# Patient Record
Sex: MALE | Race: WHITE | HISPANIC OR LATINO | Employment: FULL TIME | URBAN - METROPOLITAN AREA
[De-identification: names, ages, dates, MRNs, and addresses within clinical notes are randomized per-mention and may not be internally consistent; named-entity substitution may affect disease eponyms.]

---

## 2019-03-14 ENCOUNTER — OFFICE VISIT (OUTPATIENT)
Dept: FAMILY MEDICINE | Facility: CLINIC | Age: 39
End: 2019-03-14
Payer: COMMERCIAL

## 2019-03-14 VITALS
RESPIRATION RATE: 16 BRPM | OXYGEN SATURATION: 97 % | WEIGHT: 195.19 LBS | BODY MASS INDEX: 26.44 KG/M2 | DIASTOLIC BLOOD PRESSURE: 82 MMHG | HEART RATE: 96 BPM | HEIGHT: 72 IN | TEMPERATURE: 98 F | SYSTOLIC BLOOD PRESSURE: 128 MMHG

## 2019-03-14 DIAGNOSIS — B35.1 NAIL FUNGUS: ICD-10-CM

## 2019-03-14 DIAGNOSIS — M79.18 PAIN IN ABDOMINAL MUSCLE OF RIGHT FLANK: Primary | ICD-10-CM

## 2019-03-14 PROCEDURE — 99202 OFFICE O/P NEW SF 15 MIN: CPT | Mod: S$GLB,,, | Performed by: NURSE PRACTITIONER

## 2019-03-14 PROCEDURE — 99202 PR OFFICE/OUTPT VISIT, NEW, LEVL II, 15-29 MIN: ICD-10-PCS | Mod: S$GLB,,, | Performed by: NURSE PRACTITIONER

## 2019-03-14 RX ORDER — BACLOFEN 10 MG/1
10 TABLET ORAL 2 TIMES DAILY PRN
Qty: 60 TABLET | Refills: 0 | Status: SHIPPED | OUTPATIENT
Start: 2019-03-14 | End: 2019-04-11 | Stop reason: SDUPTHER

## 2019-03-14 RX ORDER — NAPROXEN 500 MG/1
500 TABLET ORAL 2 TIMES DAILY
Qty: 28 TABLET | Refills: 0 | Status: SHIPPED | OUTPATIENT
Start: 2019-03-14 | End: 2019-03-28

## 2019-03-14 RX ORDER — TERBINAFINE HYDROCHLORIDE 250 MG/1
250 TABLET ORAL DAILY
Qty: 90 TABLET | Refills: 0 | Status: SHIPPED | OUTPATIENT
Start: 2019-03-14

## 2019-03-14 RX ORDER — BACITRACIN ZINC 500 UNIT/G
1 OINTMENT (GRAM) TOPICAL 2 TIMES DAILY
COMMUNITY

## 2019-03-14 NOTE — PATIENT INSTRUCTIONS
Take medication as directed and return in 2 weeks for follow up. If no improvement, abdominal US will be ordered.   Muscle Strain in the Abdomen  A muscle strain is a stretching or tearing of the muscle fibers. It is also called a pulled muscle. The abdomen is protected by a thick wall of muscle in the front and sides. These muscles help with twisting and bending forward. Too much coughing, lifting heavy objects, or sudden jerking movements can sometimes cause a muscle strain in the abdomen. This causes pain that is worse when you move. The area may also feel tender or look swollen and bruised.  Home care  · Apply an ice pack over the injured area for 15 to 20 minutes every 3 to 6 hours. You should do this for the first 24 to 48 hours. You can make an ice pack by filling a plastic bag that seals at the top with ice cubes and then wrapping it with a thin towel. Be careful not to injure your skin with the ice treatments. Ice should never be applied directly to skin. Continue the use of ice packs for relief of pain and swelling as needed. After 48 hours, apply heat (warm shower or warm bath) for 15 to 20 minutes several times a day, or alternate ice and heat.  · You may use over-the-counter pain medicine to control pain, unless another pain medicine was prescribed. If you have liver or kidney disease, a stomach ulcer or GI bleeding, talk with your healthcare provider before using these medicines.  Follow-up care  Follow up with your healthcare provider, or as advised.  Call 911  Call 911 if you have:  · Weakness, lightheaded, or faint  · Chest pain  When to seek medical advice  Call your healthcare provider right away if any of these occur:  · Pain gets worse or moves to the right lower abdomen, just below the waistline  · Fever of 100.4°F (38°C) or above lasting for 24 to 48 hours  · Vomiting  · Severe abdominal pain that spreads to the back or toward the groin  · Blood in the urine  · Unexpected vaginal bleeding in  women  Date Last Reviewed: 11/19/2015  © 6363-5351 [employer]. [address] [place] [address], [place] [address]. All rights reserved. This information is not intended as a substitute for professional medical care. Always follow your healthcare professional's instructions.

## 2019-03-14 NOTE — PROGRESS NOTES
Subjective:       Patient ID: Gustavo Best is a 39 y.o. male.    Chief Complaint: est pcp, abd/groin pain    HPI   Pt c/o pain in his lower R quadrant for about 3 months, mild pain, thinks might have a hernia or pulled something when he runs or makes any movement that involves the R hip. C/o the pain worsening in the last 2 days.  Never had any abd surgery in past.   Pt c/o his great R toe having something wrong with his nail, thinks possibly a fungus      Review of Systems   Constitutional: Negative for activity change, appetite change, chills, fatigue and fever.   Respiratory: Negative for apnea, cough, chest tightness, shortness of breath and wheezing.    Cardiovascular: Negative for chest pain, palpitations and leg swelling.   Gastrointestinal: Negative for abdominal distention, abdominal pain, blood in stool, constipation, diarrhea and vomiting.   Genitourinary: Negative for difficulty urinating, dysuria, flank pain, frequency and urgency.   Musculoskeletal: Negative for arthralgias, back pain, gait problem, joint swelling and neck pain.   Skin: Negative for color change, pallor and rash.   Neurological: Negative for dizziness, tremors, syncope, weakness, light-headedness and numbness.   Hematological: Negative for adenopathy. Does not bruise/bleed easily.   Psychiatric/Behavioral: Negative for agitation, confusion and sleep disturbance. The patient is not nervous/anxious.        Objective:      Physical Exam   Constitutional: He is oriented to person, place, and time. Vital signs are normal. He appears well-developed and well-nourished.   HENT:   Head: Normocephalic and atraumatic.   Mouth/Throat: Oropharynx is clear and moist.   Eyes: Conjunctivae are normal. Pupils are equal, round, and reactive to light.   Neck: Trachea normal and normal range of motion. Neck supple. Carotid bruit is not present.   Cardiovascular: Normal rate, regular rhythm and normal heart sounds.   Pulmonary/Chest: Effort normal and  breath sounds normal.   Abdominal: Soft. Bowel sounds are normal.   Musculoskeletal: Normal range of motion.   Neurological: He is alert and oriented to person, place, and time.   Skin: Skin is warm, dry and intact.   Psychiatric: He has a normal mood and affect. His speech is normal and behavior is normal. Judgment normal.       Assessment:       1. Pain in abdominal muscle of right flank    2. Nail fungus        Plan:       Weakened abd muscles/strain suspected, but no palpable hernia. Will start on NSAID and muscle relaxer x 2 weeks and have him RTC. If no improvement than abd US will be ordered.

## 2019-03-28 ENCOUNTER — OFFICE VISIT (OUTPATIENT)
Dept: FAMILY MEDICINE | Facility: CLINIC | Age: 39
End: 2019-03-28
Payer: COMMERCIAL

## 2019-03-28 VITALS
SYSTOLIC BLOOD PRESSURE: 106 MMHG | BODY MASS INDEX: 27.02 KG/M2 | TEMPERATURE: 98 F | HEART RATE: 67 BPM | DIASTOLIC BLOOD PRESSURE: 68 MMHG | WEIGHT: 193 LBS | HEIGHT: 71 IN | OXYGEN SATURATION: 96 %

## 2019-03-28 DIAGNOSIS — B35.1 ONYCHOMYCOSIS: Primary | ICD-10-CM

## 2019-03-28 DIAGNOSIS — K40.90 UNILATERAL INGUINAL HERNIA WITHOUT OBSTRUCTION OR GANGRENE, RECURRENCE NOT SPECIFIED: ICD-10-CM

## 2019-03-28 PROBLEM — K43.9 HERNIA OF ABDOMINAL WALL: Status: ACTIVE | Noted: 2019-03-28

## 2019-03-28 PROCEDURE — 99213 OFFICE O/P EST LOW 20 MIN: CPT | Mod: S$GLB,,, | Performed by: FAMILY MEDICINE

## 2019-03-28 PROCEDURE — 99213 PR OFFICE/OUTPT VISIT, EST, LEVL III, 20-29 MIN: ICD-10-PCS | Mod: S$GLB,,, | Performed by: FAMILY MEDICINE

## 2019-03-28 NOTE — PROGRESS NOTES
Subjective:       Patient ID: Gustavo Best is a 39 y.o. male.    Chief Complaint: Follow-up (Pt is here because he is still having pain in the right flank of his abdominal muscle when he runs due to a longer strides. Pt stated that the meds he was given for pain did not help. Ms Bertha Esposito, NP prescribed Terbinafine, Baclofen, and Naproxen.)    38 yo M here for f/u on possible hernia and onychomycosis.   Pt was seen by Bertha about 2 weeks ago and was given baclofen and naproxen. Pt is here because he is still having pain in the right flank of his abdominal muscle when he runs due to a longer strides. Pt would like to have a referral to a surgeon for hernia evaluation  Pt also got terbinafine for his right big toe which has onychomycosis. Pt says it did not work. Discussed that it takes about 12 weeks to heal up.     Review of Systems   Constitutional: Negative for chills, fatigue and fever.   HENT: Negative for congestion, drooling, sneezing and sore throat.    Eyes: Negative for pain and visual disturbance.   Respiratory: Negative for cough and shortness of breath.    Cardiovascular: Negative for chest pain.   Gastrointestinal: Negative for abdominal pain, constipation, diarrhea and nausea.   Endocrine: Negative for cold intolerance and heat intolerance.   Genitourinary: Negative for difficulty urinating, frequency, scrotal swelling and testicular pain.   Musculoskeletal: Negative for myalgias.   Allergic/Immunologic: Negative for food allergies.   Neurological: Negative for seizures and headaches.   Psychiatric/Behavioral: Negative for behavioral problems.       Objective:      Physical Exam   Constitutional: He appears well-developed.   HENT:   Right Ear: External ear normal.   Left Ear: External ear normal.   Mouth/Throat: Oropharynx is clear and moist.   Eyes: Conjunctivae and EOM are normal.   Neck: Normal range of motion.   Cardiovascular: Normal rate, regular rhythm and intact distal pulses.    Pulmonary/Chest: Effort normal and breath sounds normal.   Abdominal: Soft. A hernia is present.   Small hernia in right inguinal area palpated w/ cough   Musculoskeletal: Normal range of motion.   Neurological: He is alert.   Skin: Skin is warm. Capillary refill takes less than 2 seconds.   Psychiatric: He has a normal mood and affect.   Nursing note and vitals reviewed.      Assessment:       1. Onychomycosis    2. Unilateral inguinal hernia without obstruction or gangrene, recurrence not specified        Plan:       PROBLEM LIST  No problem-specific Assessment & Plan notes found for this encounter.        Gustavo was seen today for follow-up.    Diagnoses and all orders for this visit:    Onychomycosis  Comments:  right big toe    Unilateral inguinal hernia without obstruction or gangrene, recurrence not specified  -     Ambulatory referral to General Surgery

## 2019-04-11 DIAGNOSIS — M79.18 PAIN IN ABDOMINAL MUSCLE OF RIGHT FLANK: ICD-10-CM

## 2019-04-11 RX ORDER — BACLOFEN 10 MG/1
10 TABLET ORAL 2 TIMES DAILY PRN
Qty: 60 TABLET | Refills: 0 | Status: SHIPPED | OUTPATIENT
Start: 2019-04-11

## 2020-02-07 ENCOUNTER — OFFICE VISIT (OUTPATIENT)
Dept: FAMILY MEDICINE | Facility: CLINIC | Age: 40
End: 2020-02-07
Payer: COMMERCIAL

## 2020-02-07 VITALS
OXYGEN SATURATION: 99 % | SYSTOLIC BLOOD PRESSURE: 111 MMHG | HEART RATE: 70 BPM | HEIGHT: 71 IN | RESPIRATION RATE: 16 BRPM | DIASTOLIC BLOOD PRESSURE: 62 MMHG | WEIGHT: 187.19 LBS | TEMPERATURE: 97 F | BODY MASS INDEX: 26.21 KG/M2

## 2020-02-07 DIAGNOSIS — E03.9 HYPOTHYROIDISM, UNSPECIFIED TYPE: ICD-10-CM

## 2020-02-07 DIAGNOSIS — E53.8 B12 DEFICIENCY: ICD-10-CM

## 2020-02-07 DIAGNOSIS — E78.5 HYPERLIPIDEMIA, UNSPECIFIED HYPERLIPIDEMIA TYPE: ICD-10-CM

## 2020-02-07 DIAGNOSIS — E55.9 VITAMIN D DEFICIENCY: ICD-10-CM

## 2020-02-07 DIAGNOSIS — N52.9 ERECTILE DYSFUNCTION, UNSPECIFIED ERECTILE DYSFUNCTION TYPE: ICD-10-CM

## 2020-02-07 DIAGNOSIS — E11.9 TYPE 2 DIABETES MELLITUS WITHOUT COMPLICATION, WITHOUT LONG-TERM CURRENT USE OF INSULIN: ICD-10-CM

## 2020-02-07 DIAGNOSIS — R53.83 FATIGUE, UNSPECIFIED TYPE: Primary | ICD-10-CM

## 2020-02-07 DIAGNOSIS — I10 HTN (HYPERTENSION), BENIGN: ICD-10-CM

## 2020-02-07 DIAGNOSIS — F19.90 DRUG USE: ICD-10-CM

## 2020-02-07 LAB
ABS NRBC COUNT: 0 X 10 3/UL (ref 0–0.01)
ABSOLUTE BASOPHIL: 0.03 X 10 3/UL (ref 0–0.22)
ABSOLUTE EOSINOPHIL: 0.09 X 10 3/UL (ref 0.04–0.54)
ABSOLUTE IMMATURE GRAN: 0.01 X 10 3/UL (ref 0–0.04)
ABSOLUTE LYMPHOCYTE: 1.27 X 10 3/UL (ref 0.86–4.75)
ABSOLUTE MONOCYTE: 0.32 X 10 3/UL (ref 0.22–1.08)
ALBUMIN SERPL-MCNC: 4.9 G/DL (ref 3.5–5.2)
ALBUMIN/GLOB SERPL ELPH: 2 {RATIO} (ref 1–2.7)
ALP ISOS SERPL LEV INH-CCNC: 57 U/L (ref 40–130)
ALT (SGPT): 30 U/L (ref 0–41)
ANION GAP SERPL CALC-SCNC: 13 MMOL/L (ref 8–17)
AST SERPL-CCNC: 34 U/L (ref 0–40)
B12: 752 PG/ML (ref 232–1245)
BASOPHILS NFR BLD: 0.8 % (ref 0.2–1.2)
BILIRUBIN, TOTAL: 0.75 MG/DL (ref 0–1.2)
BIOAVAILABLE TESTOSTERONE: 300 NG/DL (ref 80–614)
BUN/CREAT SERPL: 9.2 (ref 6–20)
CALCIUM SERPL-MCNC: 9.8 MG/DL (ref 8.6–10.2)
CARBON DIOXIDE, CO2: 31 MMOL/L (ref 22–29)
CHLORIDE: 101 MMOL/L (ref 98–107)
CHOLEST SERPL-MSCNC: 196 MG/DL (ref 100–200)
CREAT SERPL-MCNC: 1.16 MG/DL (ref 0.7–1.2)
EOSINOPHIL NFR BLD: 2.3 % (ref 0.7–7)
ESTIMATED AVERAGE GLUCOSE: 105 MG/DL
FREE TESTOSTERONE: 11.3 NG/DL (ref 1–26)
GFR ESTIMATION: 74.97
GLOBULIN: 2.5 G/DL (ref 1.5–4.5)
GLUCOSE: 100 MG/DL (ref 74–106)
HBA1C MFR BLD: 5.3 % (ref 4–6)
HCT VFR BLD AUTO: 50.9 % (ref 42–52)
HDLC SERPL-MCNC: 50 MG/DL
HGB BLD-MCNC: 17.5 G/DL (ref 14–18)
IMMATURE GRANULOCYTES: 0.3 % (ref 0–0.5)
LDL/HDL RATIO: 2.6 (ref 1–3)
LDLC SERPL CALC-MCNC: 132.4 MG/DL (ref 0–100)
LYMPHOCYTES NFR BLD: 32.7 % (ref 19.3–53.1)
MCH RBC QN AUTO: 31.2 PG (ref 27–32)
MCHC RBC AUTO-ENTMCNC: 34.4 G/DL (ref 32–36)
MCV RBC AUTO: 90.7 FL (ref 80–94)
MONOCYTES NFR BLD: 8.2 % (ref 4.7–12.5)
NEUTROPHILS ABSOLUTE COUNT: 2.16 X 10 3/UL (ref 2.15–7.56)
NEUTROPHILS NFR BLD: 55.7 % (ref 34–71.1)
NUCLEATED RED BLOOD CELLS: 0 /100 WBC (ref 0–0.2)
PLATELET # BLD AUTO: 205 X 10 3/UL (ref 135–400)
POTASSIUM: 4.7 MMOL/L (ref 3.5–5.1)
PROT SNV-MCNC: 7.4 G/DL (ref 6.4–8.3)
RBC # BLD AUTO: 5.61 X 10 6/UL (ref 4.7–6.1)
RDW-SD: 40.1 FL (ref 37–54)
SHBG: 49.9 NMOL/L (ref 16.5–55.9)
SODIUM: 145 MMOL/L (ref 136–145)
TESTOST SERPL-MCNC: 701 NG/DL (ref 249–836)
TRIGL SERPL-MCNC: 68 MG/DL (ref 0–150)
TSH SERPL DL<=0.005 MIU/L-ACNC: 2.2 UIU/ML (ref 0.27–4.2)
UREA NITROGEN (BUN): 10.7 MG/DL (ref 6–20)
VITAMIN D (25OHD): 20.6 NG/ML
WBC # BLD: 3.88 X 10 3/UL (ref 4.3–10.8)

## 2020-02-07 PROCEDURE — 99213 OFFICE O/P EST LOW 20 MIN: CPT | Mod: S$GLB,,, | Performed by: FAMILY MEDICINE

## 2020-02-07 PROCEDURE — 99213 PR OFFICE/OUTPT VISIT, EST, LEVL III, 20-29 MIN: ICD-10-PCS | Mod: S$GLB,,, | Performed by: FAMILY MEDICINE

## 2020-02-07 NOTE — PROGRESS NOTES
Subjective:       Patient ID: Gustavo Best is a 40 y.o. male.    Chief Complaint: Follow-up (pt is here for a check up and get some blood work done. pt states he has been having issues with his testosterone. ); Fatigue; Anxiety; and Depression    39 yo M here for fatigue and new onset of ED (new dx). He can have an erection but it does not stay hard. This started after he weaned off of some otc supplements. He has felt fatigued for about one week - he cannot sleep during the night x 1 month. Pt also has depression/anxiety as he has broken up with his girl friend 2 weeks ago. Both otc supplements appear to have pro-hormones (testosterone) in it.   Pt says that he took them about 8 weeks.   Pt also has broken up with his girl friend (with whom he still lives) and that gives him some anxiety and depression. He thinks once he is over this a little more he will feel better. Discussed that the gold-standard starting anti diepressant (SSRI) has sexual side effects and that we will wait for the lab results before treating.  Fatigue is also new. All signs/symptoms have started around 2 months ago.     Review of Systems   Constitutional: Positive for fatigue. Negative for chills and fever.   HENT: Negative for congestion, drooling, sneezing and sore throat.    Eyes: Negative for pain and visual disturbance.   Respiratory: Negative for cough and shortness of breath.    Cardiovascular: Negative for chest pain.   Gastrointestinal: Negative for abdominal pain, constipation, diarrhea and nausea.   Endocrine: Negative for cold intolerance and heat intolerance.   Genitourinary: Negative for difficulty urinating and frequency.   Musculoskeletal: Negative for myalgias.   Allergic/Immunologic: Negative for food allergies.   Neurological: Negative for seizures and headaches.   Psychiatric/Behavioral: Negative for behavioral problems.       Objective:      Physical Exam   Constitutional: He appears well-developed.   HENT:   Right Ear:  External ear normal.   Left Ear: External ear normal.   Mouth/Throat: Oropharynx is clear and moist.   Eyes: Conjunctivae and EOM are normal.   Neck: Normal range of motion.   Cardiovascular: Normal rate, regular rhythm and intact distal pulses.   Pulmonary/Chest: Effort normal and breath sounds normal.   Abdominal: Soft.   Musculoskeletal: Normal range of motion.   Neurological: He is alert.   Skin: Skin is warm. Capillary refill takes less than 2 seconds.   Psychiatric: He has a normal mood and affect.   Nursing note and vitals reviewed.      Assessment:       1. Fatigue, unspecified type    2. Erectile dysfunction, unspecified erectile dysfunction type    3. Drug use    4. HTN (hypertension), benign    5. Hyperlipidemia, unspecified hyperlipidemia type    6. Vitamin D deficiency    7. Type 2 diabetes mellitus without complication, without long-term current use of insulin    8. Hypothyroidism, unspecified type    9. B12 deficiency        Plan:       PROBLEM LIST     Gustavo was seen today for follow-up, fatigue, anxiety and depression.    Diagnoses and all orders for this visit:    Fatigue, unspecified type  -     TESTOSTERONE FREE BIOAVAILABLE & TOTAL; Future  -     TESTOSTERONE FREE BIOAVAILABLE & TOTAL    Erectile dysfunction, unspecified erectile dysfunction type  -     TESTOSTERONE FREE BIOAVAILABLE & TOTAL; Future  -     TESTOSTERONE FREE BIOAVAILABLE & TOTAL    Drug use  -     TESTOSTERONE FREE BIOAVAILABLE & TOTAL; Future  -     TESTOSTERONE FREE BIOAVAILABLE & TOTAL    HTN (hypertension), benign  -     CBC auto differential; Future  -     Comprehensive metabolic panel; Future  -     CBC auto differential  -     Comprehensive metabolic panel    Hyperlipidemia, unspecified hyperlipidemia type  -     Lipid panel; Future  -     Lipid panel    Vitamin D deficiency  -     Vitamin D; Future  -     Vitamin D    Type 2 diabetes mellitus without complication, without long-term current use of insulin  -     Hemoglobin  A1c; Future  -     Hemoglobin A1c    Hypothyroidism, unspecified type  -     TSH; Future  -     TSH    B12 deficiency  -     Vitamin B12; Future  -     Vitamin B12

## 2020-02-27 ENCOUNTER — OFFICE VISIT (OUTPATIENT)
Dept: FAMILY MEDICINE | Facility: CLINIC | Age: 40
End: 2020-02-27
Payer: COMMERCIAL

## 2020-02-27 VITALS
TEMPERATURE: 97 F | HEIGHT: 71 IN | BODY MASS INDEX: 26.41 KG/M2 | HEART RATE: 62 BPM | DIASTOLIC BLOOD PRESSURE: 72 MMHG | WEIGHT: 188.63 LBS | SYSTOLIC BLOOD PRESSURE: 118 MMHG | RESPIRATION RATE: 16 BRPM

## 2020-02-27 DIAGNOSIS — L64.9 MALE PATTERN BALDNESS: Chronic | ICD-10-CM

## 2020-02-27 DIAGNOSIS — E78.2 MODERATE MIXED HYPERLIPIDEMIA NOT REQUIRING STATIN THERAPY: Chronic | ICD-10-CM

## 2020-02-27 DIAGNOSIS — E55.9 VITAMIN D DEFICIENCY: Chronic | ICD-10-CM

## 2020-02-27 DIAGNOSIS — Z71.2 ENCOUNTER TO DISCUSS TEST RESULTS: Primary | ICD-10-CM

## 2020-02-27 PROCEDURE — 99214 OFFICE O/P EST MOD 30 MIN: CPT | Mod: S$GLB,,, | Performed by: FAMILY MEDICINE

## 2020-02-27 PROCEDURE — 99214 PR OFFICE/OUTPT VISIT, EST, LEVL IV, 30-39 MIN: ICD-10-PCS | Mod: S$GLB,,, | Performed by: FAMILY MEDICINE

## 2020-02-27 NOTE — PROGRESS NOTES
Subjective:       Patient ID: Gustavo Best is a 40 y.o. male.    Chief Complaint: Follow-up (pt is here to go over his lab work. no c/o)    39 yo M here to discuss lab results.  Pt's labs are mainly normal besides HLD and vitamin d deficiency.  HLD - this is a new dx: ascvd for a 39 yo would be 3.1% - not in a statin benefit group.  Vitamin d defciency at 22: discussed to start supplements.   Testosterone levels are very normal. He stated first that he thought maybe his values are low which would explain his tiredness. But then he tells me that he is maybe having too much testosterone.  Pt is worried because of his oncoming male pattern baldness. He read about finasteride and how that medication could get rid of his baldness. As per pt, his uncle has used it successfully. Discussed the MoA of finasteride and that he will have less of the potent testosterone. And that he is correct that testosterone does activate the baldness in most circumstances. Explained how finasteride is used in the medical setting.     I spent 25 minutes with the patient face-to-face; more than 50% was in counseling about medication and patient condition    Review of Systems   Constitutional: Negative for chills, fatigue and fever.   HENT: Negative for congestion, drooling, sneezing and sore throat.    Eyes: Negative for pain and visual disturbance.   Respiratory: Negative for cough and shortness of breath.    Cardiovascular: Negative for chest pain.   Gastrointestinal: Negative for abdominal pain, constipation, diarrhea and nausea.   Endocrine: Negative for cold intolerance and heat intolerance.   Genitourinary: Negative for difficulty urinating and frequency.   Musculoskeletal: Negative for myalgias.   Allergic/Immunologic: Negative for food allergies.   Neurological: Negative for seizures and headaches.   Psychiatric/Behavioral: Negative for behavioral problems.       Objective:      Physical Exam   Constitutional: He appears  well-developed.   HENT:   Right Ear: External ear normal.   Left Ear: External ear normal.   Mouth/Throat: Oropharynx is clear and moist.   Eyes: Conjunctivae and EOM are normal.   Neck: Normal range of motion.   Cardiovascular: Normal rate, regular rhythm and intact distal pulses.   Pulmonary/Chest: Effort normal and breath sounds normal.   Abdominal: Soft.   Musculoskeletal: Normal range of motion.   Neurological: He is alert.   Skin: Skin is warm. Capillary refill takes less than 2 seconds.   Psychiatric: He has a normal mood and affect.   Nursing note and vitals reviewed.      Assessment:       1. Encounter to discuss test results    2. Moderate mixed hyperlipidemia not requiring statin therapy    3. Vitamin D deficiency    4. 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    Diagnoses and all orders for this visit:    Encounter to discuss test results    Moderate mixed hyperlipidemia not requiring statin therapy  Comments:  ascvd = 3.1% - increase oats and fiber    Vitamin D deficiency  Comments:  over the counter supplements    Male pattern baldness  Comments:  new dx, minoxidl - no finasteride indicated